# Patient Record
Sex: MALE | Race: WHITE | Employment: OTHER | URBAN - METROPOLITAN AREA
[De-identification: names, ages, dates, MRNs, and addresses within clinical notes are randomized per-mention and may not be internally consistent; named-entity substitution may affect disease eponyms.]

---

## 2018-10-15 DIAGNOSIS — I35.0 NODULAR CALCIFIC AORTIC VALVE STENOSIS: Primary | ICD-10-CM

## 2018-10-15 DIAGNOSIS — N18.9 CHRONIC KIDNEY DISEASE, UNSPECIFIED CKD STAGE: ICD-10-CM

## 2018-11-05 ENCOUNTER — OFFICE VISIT (OUTPATIENT)
Dept: CARDIOLOGY | Facility: CLINIC | Age: 83
End: 2018-11-05
Payer: MEDICARE

## 2018-11-05 ENCOUNTER — HOSPITAL ENCOUNTER (OUTPATIENT)
Dept: CARDIOLOGY | Facility: CLINIC | Age: 83
Discharge: HOME | End: 2018-11-05
Attending: INTERNAL MEDICINE
Payer: MEDICARE

## 2018-11-05 VITALS
WEIGHT: 214.5 LBS | HEART RATE: 69 BPM | BODY MASS INDEX: 30.03 KG/M2 | HEIGHT: 71 IN | DIASTOLIC BLOOD PRESSURE: 79 MMHG | SYSTOLIC BLOOD PRESSURE: 187 MMHG | OXYGEN SATURATION: 99 %

## 2018-11-05 VITALS — DIASTOLIC BLOOD PRESSURE: 68 MMHG | HEART RATE: 62 BPM | SYSTOLIC BLOOD PRESSURE: 160 MMHG

## 2018-11-05 DIAGNOSIS — I65.03 STENOSIS OF BOTH VERTEBRAL ARTERIES: ICD-10-CM

## 2018-11-05 DIAGNOSIS — K27.4 GASTROINTESTINAL HEMORRHAGE ASSOCIATED WITH PEPTIC ULCER: ICD-10-CM

## 2018-11-05 DIAGNOSIS — I48.0 PAROXYSMAL ATRIAL FIBRILLATION: ICD-10-CM

## 2018-11-05 DIAGNOSIS — I35.0 NODULAR CALCIFIC AORTIC VALVE STENOSIS: Primary | ICD-10-CM

## 2018-11-05 DIAGNOSIS — Z95.0 CARDIAC PACEMAKER IN SITU: ICD-10-CM

## 2018-11-05 DIAGNOSIS — I10 ESSENTIAL HYPERTENSION: ICD-10-CM

## 2018-11-05 DIAGNOSIS — Z86.73 HISTORY OF TRANSIENT ISCHEMIC ATTACK (TIA): ICD-10-CM

## 2018-11-05 DIAGNOSIS — I35.0 NONRHEUMATIC AORTIC VALVE STENOSIS: ICD-10-CM

## 2018-11-05 DIAGNOSIS — I35.0 NODULAR CALCIFIC AORTIC VALVE STENOSIS: ICD-10-CM

## 2018-11-05 PROBLEM — I65.09 VERTEBRAL ARTERY STENOSIS: Status: ACTIVE | Noted: 2018-11-05

## 2018-11-05 LAB
ASCENDING AORTA: 3.5 CM
AV MEAN GRADIENT: 25 MMHG
AV PEAK GRADIENT: 44 MMHG
AV VALVE AREA: 1.21 CM2
CV ECHO LV RWT: 0.3 CM
DOP CALC AO PEAK VEL: 3.3 M/S
DOP CALC AO VTI: 86 CM
DOP CALC LVOT AREA: 3.87 CM2
DOP CALC LVOT DIAMETER: 2.22 CM
DOP CALC LVOT STROKE VOLUME: 104.46 CM3
DOP CALCLVOT PEAK VEL VTI: 27 CM
E WAVE DECELERATION TIME: 241.58 MSEC
E/A RATIO: 0.92
E/E' RATIO: 12.86
ECHO LV POSTERIOR WALL: 0.72 CM (ref 0.6–1.1)
FRACTIONAL SHORTENING: 27 % (ref 28–44)
INTERVENTRICULAR SEPTUM: 0.89 CM (ref 0.6–1.1)
IVRT: 0.09 MSEC
LA MAJOR: 5.25 CM
LA MINOR: 5.26 CM
LA WIDTH: 3.65 CM
LEFT ATRIUM SIZE: 4.13 CM
LEFT ATRIUM VOLUME: 67.33 CM3
LEFT INTERNAL DIMENSION IN SYSTOLE: 3.53 CM (ref 2.1–4)
LEFT VENTRICLE DIASTOLIC VOLUME: 109.25 ML
LEFT VENTRICLE SYSTOLIC VOLUME: 51.85 ML
LEFT VENTRICULAR INTERNAL DIMENSION IN DIASTOLE: 4.83 CM (ref 3.5–6)
LEFT VENTRICULAR MASS: 129.07 G
LV LATERAL E/E' RATIO: 11.25
LV SEPTAL E/E' RATIO: 15
MV PEAK A VEL: 0.98 M/S
MV PEAK E VEL: 0.9 M/S
MV STENOSIS PRESSURE HALF TIME: 70.06 MS
MV VALVE AREA P 1/2 METHOD: 3.14 CM2
PISA TR MAX VEL: 2.62 M/S
PULM VEIN S/D RATIO: 0.97
PV PEAK D VEL: 0.59 M/S
PV PEAK S VEL: 0.57 M/S
RA MAJOR: 4.38 CM
RA PRESSURE: 3 MMHG
RA WIDTH: 2.9 CM
RETIRED EF AND QEF - SEE NOTES: 52.54 %
RIGHT VENTRICULAR END-DIASTOLIC DIMENSION: 3.28 CM
SINUS: 3.71 CM
STJ: 3.05 CM
TDI LATERAL: 0.08
TDI SEPTAL: 0.06
TDI: 0.07
TR MAX PG: 27.46 MMHG
TRICUSPID ANNULAR PLANE SYSTOLIC EXCURSION: 2.14 CM
TV REST PULMONARY ARTERY PRESSURE: 30.46 MMHG

## 2018-11-05 PROCEDURE — 99999 PR PBB SHADOW E&M-EST. PATIENT-LVL IV: CPT | Mod: PBBFAC,,,

## 2018-11-05 PROCEDURE — 99204 OFFICE O/P NEW MOD 45 MIN: CPT | Mod: S$PBB,,, | Performed by: INTERNAL MEDICINE

## 2018-11-05 PROCEDURE — 93306 TTE W/DOPPLER COMPLETE: CPT | Mod: PBBFAC | Performed by: INTERNAL MEDICINE

## 2018-11-05 PROCEDURE — 99205 OFFICE O/P NEW HI 60 MIN: CPT | Mod: S$PBB,,, | Performed by: THORACIC SURGERY (CARDIOTHORACIC VASCULAR SURGERY)

## 2018-11-05 PROCEDURE — 99214 OFFICE O/P EST MOD 30 MIN: CPT | Mod: PBBFAC,25

## 2018-11-05 RX ORDER — AMLODIPINE BESYLATE 2.5 MG/1
2.5 TABLET ORAL DAILY
COMMUNITY

## 2018-11-05 RX ORDER — NITROGLYCERIN 0.4 MG/1
0.4 TABLET SUBLINGUAL EVERY 5 MIN PRN
COMMUNITY

## 2018-11-05 RX ORDER — CLOPIDOGREL BISULFATE 75 MG/1
75 TABLET ORAL DAILY
COMMUNITY

## 2018-11-05 RX ORDER — UBIDECARENONE 75 MG
500 CAPSULE ORAL DAILY
COMMUNITY

## 2018-11-05 RX ORDER — PANTOPRAZOLE SODIUM 40 MG/1
40 TABLET, DELAYED RELEASE ORAL 2 TIMES DAILY
COMMUNITY

## 2018-11-05 RX ORDER — METOPROLOL SUCCINATE 50 MG/1
50 TABLET, EXTENDED RELEASE ORAL DAILY
COMMUNITY

## 2018-11-05 RX ORDER — DOCUSATE SODIUM 100 MG/1
100 CAPSULE, LIQUID FILLED ORAL 2 TIMES DAILY
COMMUNITY

## 2018-11-05 RX ORDER — LOSARTAN POTASSIUM 100 MG/1
100 TABLET ORAL DAILY
COMMUNITY

## 2018-11-05 RX ORDER — ROSUVASTATIN CALCIUM 5 MG/1
5 TABLET, COATED ORAL DAILY
COMMUNITY

## 2018-11-05 RX ORDER — FUROSEMIDE 20 MG/1
20 TABLET ORAL 2 TIMES DAILY
COMMUNITY

## 2018-11-05 RX ORDER — FERROUS SULFATE 324(65)MG
325 TABLET, DELAYED RELEASE (ENTERIC COATED) ORAL 3 TIMES DAILY
COMMUNITY

## 2018-11-05 NOTE — ASSESSMENT & PLAN NOTE
Moderate.     He has undergone the following TAVR work-up:  · Echo (11/5/18): JOSE R is 1.21 cm2; peak velocity is 3.3 m/s; mean gradient is 25 mmHg, EF= 60%  · Needs coronary angiogram  · Frailty: 0/4  · Prelim STS= 3.9%  · Rhythm issues: PPM in situ  · Needs TAVR CTA  · He is moderate risk per Dr. De La Cruz due to age and comorbidities.   · Comorbidities: s/p bilat Vert stents, hx of TIA, PAF s/p DCCV, Hx of GIB and PUD, HTN

## 2018-11-05 NOTE — PROGRESS NOTES
Cardiothoracic Surgery  Transcatheter Aortic Valve Replacement Evaluation      SUBJECTIVE:     History of Present Illness:  Patient is an 84 year old male with pAF, PPM, prior TIA, vertebral artery stenosis s/p b/l stents, prior GIB, HTN, who comes to valve clinic today with moderate aortic stenosis.  He states that he has dyspnea on exertion after walking a few blocks.  He is currently being followed every 6 months with echos for his valve disease.  He comes here today for a second opinion about management of his moderate aortic stenosis.             Past Medical History:   Diagnosis Date    Hyperlipidemia     Hypertension      No past surgical history on file.  No family history on file.  Social History     Tobacco Use    Smoking status: Former Smoker     Last attempt to quit: 1968     Years since quittin.0    Smokeless tobacco: Never Used   Substance Use Topics    Alcohol use: No     Frequency: Never    Drug use: Not on file      Review of patient's allergies indicates:  No Known Allergies  Current medications reviewed    Review of Systems:  Constitutional: Negative for fever, chills, distress  Skin: no acute disorders.  Negative for rash, itching  HEENT: unremarkable.  Negative for sore throat, congestion  Cardiovascular: Negative for chest pain, orthopnea, lower extremity edema.    Respiratory: Negative for cough.  Positive for shortness of breath  GI:  unremarkable.  Negative for abdominal pain, vomiting  :  Negative for hematuria, flank pain  Musculoskeletal: unremarkable.  Negative for falls, myalgias  Neuro:  Negative for dizziness, LOC  Psych:  Negative for substance abuse, memory loss      OBJECTIVE:     Vital Signs (Most Recent)     Vital signs reviewed    Physical Exam:  General Appearance: no acute distress.  Normal for age  Skin: no acute lesions  HEENT: no masses/hematoma, Jugular veins: not distended  Resp:  excursion/effort normal; clear to auscultation  CV:  Rate:   regular  Rhythm: regular  Murmur:  systolic ejection murmur at right upper sternal border  GI: Bowel sounds: present; abdo soft, nondistended, nontender, no masses palpable  Extrem: Edema: minimal  Pulses: normal  Neuro: Alert and oriented; no focal deficit  Psych: no acute delirium noted  : voiding well  MSK: no acute findings, ranges of motion unchanged    He has undergone the following TAVR work-up:  ? Echo (11/5/18): JOSE R is 1.21 cm2; peak velocity is 3.3 m/s; mean gradient is 25 mmHg, EF= 60%  ? Needs coronary angiogram  ? Frailty: 0/4  ? Prelim STS= 3.9%  ? Rhythm issues: PPM in situ  ? Needs TAVR CTA  ? Comorbidities: s/p bilat Vert stents, hx of TIA, PAF s/p DCCV, Hx of GIB and PUD, HTN      I have personally reviewed the imaging, electrocardiogram, and pertinent lab findings    ASSESSMENT/PLAN:       84 y.o. male with multiple comorbidities including pAF, PPM, prior TIA, vertebral artery stenosis s/p b/l stents, prior GIB, HTN, and frailty presented with moderate aortic stenosis.     Currently, the current management of serial TTE's is appropriate.  If his valvular disease progresses to severe, he should see us again.  He is deemed intermediate risk for surgical aortic valve replacement.      I spent >70 minutes reviewing, examining and evaluating this patient, including reviewing relevant diagnostic studies, and 50% of which were spent on patient counseling including the pt's condition, diagnosis, prognosis, expected recovery after SAVR vs TAVR, follow-up plan and next steps.

## 2018-11-05 NOTE — PROGRESS NOTES
"Subjective:    Patient ID:  Travon Lynn is a 84 y.o. male who presents for evaluation of Aortic Stenosis    Referring: Dr. Galaviz    HPI  Mr. Lynn is a delightful gentleman who has followed with Dr. Galaviz in [place] for more than 15 years. He has a PMH significant for vertebral artery stenosis s/p bilateral stents, hx of TIA (2010), paroxysmal a-fib s/p DCCV (2011), hx of GIB and PUD (2011), moderate AS, PPM in situ, and HTN. He has noted worsening PRAJAPATI over the past 6-8 months. He has known about his AS since 2013, but it was not severe, so he was followed every 6 months with an echo. He recently had a SHIREEN (Dr. Charles) and was told his AS was "almost severe". He and his wife were concerned, and expressed this to their neurologist, Dr. Rivas Awad, who suggested they seek a second opinion in [place] with Dr. Calabrese. He presents today for further evaluation.     He has undergone the following TAVR work-up:  · Echo (11/5/18): JOSE R is 1.21 cm2; peak velocity is 3.3 m/s; mean gradient is 25 mmHg, EF= 60%  · Needs coronary angiogram  · Frailty: 0/4  · Prelim STS= 3.9%  · Rhythm issues: PPM in situ  · Needs TAVR CTA  · He is moderate risk per Dr. De La Cruz due to age and comorbidities.   · Comorbidities: s/p bilat Vert stents, hx of TIA, PAF s/p DCCV, Hx of GIB and PUD, HTN    Review of Systems   Constitution: Negative for chills, diaphoresis, fever, weakness, weight gain and weight loss.   HENT: Negative for sore throat.    Eyes: Negative for blurred vision, vision loss in left eye, vision loss in right eye and visual disturbance.   Cardiovascular: Positive for dyspnea on exertion. Negative for chest pain, claudication, leg swelling, near-syncope, orthopnea, palpitations, paroxysmal nocturnal dyspnea and syncope.   Respiratory: Negative for cough, hemoptysis, shortness of breath, sputum production and wheezing.    Endocrine: Negative for cold intolerance and heat intolerance.   Hematologic/Lymphatic: " "Negative for adenopathy. Does not bruise/bleed easily.   Skin: Negative for rash.   Musculoskeletal: Negative for falls, muscle weakness and myalgias.   Gastrointestinal: Negative for abdominal pain, change in bowel habit, constipation, diarrhea, melena and nausea.   Genitourinary: Negative for bladder incontinence.   Neurological: Negative for dizziness, focal weakness, headaches, light-headedness and numbness.   Psychiatric/Behavioral: Negative for altered mental status.         Vitals:    11/05/18 1348 11/05/18 1351   BP: (!) 199/86 (!) 187/79   BP Location: Left arm Right arm   Patient Position: Sitting Sitting   BP Method: Large (Automatic) Large (Automatic)   Pulse: 76 69   SpO2: 99%    Weight: 97.3 kg (214 lb 8.1 oz)    Height: 5' 10.5" (1.791 m)    Body mass index is 30.34 kg/m².    Objective:    Physical Exam   Constitutional: He is oriented to person, place, and time. He appears well-developed and well-nourished.   HENT:   Head: Normocephalic and atraumatic.   Eyes: EOM are normal. Pupils are equal, round, and reactive to light.   Neck: Neck supple. No JVD present. No tracheal deviation present. No thyromegaly present.   Cardiovascular: Normal rate, regular rhythm, S1 normal, S2 normal, intact distal pulses and normal pulses. PMI is not displaced. Exam reveals no gallop and no friction rub.   No murmur heard.  Pulmonary/Chest: Effort normal and breath sounds normal. No respiratory distress. He has no wheezes. He has no rales. He exhibits no tenderness.   Abdominal: Soft. Bowel sounds are normal. He exhibits no distension and no mass. There is no tenderness.   Musculoskeletal: Normal range of motion. He exhibits no edema or tenderness.   Neurological: He is alert and oriented to person, place, and time.   Skin: Skin is warm and dry. No rash noted.   Psychiatric: He has a normal mood and affect. His behavior is normal.         Assessment:         Nonrheumatic aortic valve stenosis  Moderate.     He has " undergone the following TAVR work-up:  · Echo (11/5/18): JOSE R is 1.21 cm2; peak velocity is 3.3 m/s; mean gradient is 25 mmHg, EF= 60%  · Needs coronary angiogram  · Frailty: 0/4  · Prelim STS= 3.9%  · Rhythm issues: PPM in situ  · Needs TAVR CTA  · He is moderate risk per Dr. De La Cruz due to age and comorbidities.   · Comorbidities: s/p bilat Vert stents, hx of TIA, PAF s/p DCCV, Hx of GIB and PUD, HTN    Vertebral artery stenosis  S/p bilateral stents.   On ASA, Plavix, and statin.     History of transient ischemic attack (TIA)  2011.   Follows with Neurology, Dr. Awad.     Paroxysmal atrial fibrillation  S/p DCCV in 2011.   No further episodes since that time per patient's report.   On Multaq.   Not on AC.       Gastrointestinal hemorrhage associated with peptic ulcer  2011.  S/p transfusion.   H/H stable.   On iron supplementation.     Cardiac pacemaker in situ  Recent battery change.  Follows with Dr. Galaviz.     Essential hypertension  Uncontrolled.   Will defer further management to Dr. Galaviz.     Plan:       Patient does not have severe AS. Symptoms of PRAJAPATI could be secondary to CAD vs. Diastolic dysfunction. Recommend coronary angiogram -- patient wishes to have this done in [place]. If unremarkable, would treat for diastolic dysfunction with aggressive BP control (ARB), diuretic, and salt/fluid restrictions.   If/when the patient develops severe AS, we would be happy to see him for TAVR.         Staff:  I have personally taken the history and examined this patient and agree with the fellow's note as stated above and amended it accordingly :-) His AS is not severe enough to treat yet, but I recommend he have a coronary angiogram and possible PCI because of his symptom-limiting PRAJAPATI.  [facility]

## 2018-11-05 NOTE — ASSESSMENT & PLAN NOTE
S/p DCCV in 2011.   No further episodes since that time per patient's report.   On Multaq.   Not on AC.

## 2024-06-12 ENCOUNTER — APPOINTMENT (RX ONLY)
Dept: URBAN - NONMETROPOLITAN AREA CLINIC 1 | Facility: CLINIC | Age: 89
Setting detail: DERMATOLOGY
End: 2024-06-12

## 2024-06-12 DIAGNOSIS — L57.8 OTHER SKIN CHANGES DUE TO CHRONIC EXPOSURE TO NONIONIZING RADIATION: ICD-10-CM

## 2024-06-12 DIAGNOSIS — L82.0 INFLAMED SEBORRHEIC KERATOSIS: ICD-10-CM

## 2024-06-12 DIAGNOSIS — L81.4 OTHER MELANIN HYPERPIGMENTATION: ICD-10-CM

## 2024-06-12 DIAGNOSIS — L57.0 ACTINIC KERATOSIS: ICD-10-CM

## 2024-06-12 PROCEDURE — 17110 DESTRUCTION B9 LES UP TO 14: CPT

## 2024-06-12 PROCEDURE — 99203 OFFICE O/P NEW LOW 30 MIN: CPT | Mod: 25

## 2024-06-12 PROCEDURE — ? SUNSCREEN RECOMMENDATIONS

## 2024-06-12 PROCEDURE — ? LIQUID NITROGEN

## 2024-06-12 PROCEDURE — ? PHOTO-DOCUMENTATION

## 2024-06-12 PROCEDURE — ? COUNSELING

## 2024-06-12 PROCEDURE — ? OBSERVATION AND MEASURE

## 2024-06-12 PROCEDURE — ? MDM - TREATMENT GOALS

## 2024-06-12 PROCEDURE — 17000 DESTRUCT PREMALG LESION: CPT | Mod: 59

## 2024-06-12 ASSESSMENT — LOCATION SIMPLE DESCRIPTION DERM
LOCATION SIMPLE: RIGHT EAR
LOCATION SIMPLE: RIGHT HAND
LOCATION SIMPLE: LEFT FOREARM
LOCATION SIMPLE: FRONTAL SCALP
LOCATION SIMPLE: LEFT CHEEK

## 2024-06-12 ASSESSMENT — LOCATION DETAILED DESCRIPTION DERM
LOCATION DETAILED: RIGHT RADIAL DORSAL HAND
LOCATION DETAILED: LEFT VENTRAL PROXIMAL FOREARM
LOCATION DETAILED: LEFT CENTRAL MALAR CHEEK
LOCATION DETAILED: RIGHT INFERIOR POSTERIOR HELIX
LOCATION DETAILED: MEDIAL FRONTAL SCALP

## 2024-06-12 ASSESSMENT — LOCATION ZONE DERM
LOCATION ZONE: ARM
LOCATION ZONE: EAR
LOCATION ZONE: SCALP
LOCATION ZONE: FACE
LOCATION ZONE: HAND

## 2024-06-12 NOTE — PROCEDURE: LIQUID NITROGEN
Consent: The patient's consent was obtained including but not limited to risks of crusting, scabbing, blistering, scarring, darker or lighter pigmentary change, recurrence, incomplete removal and infection.
Show Aperture Variable?: Yes
Render Note In Bullet Format When Appropriate: No
Duration Of Freeze Thaw-Cycle (Seconds): 5
Post-Care Instructions: I reviewed with the patient in detail post-care instructions. Patient is to wear sunprotection, and avoid picking at any of the treated lesions. Pt may apply Vaseline to crusted or scabbing areas.
Detail Level: Detailed
Medical Necessity Clause: This procedure was medically necessary because the lesions that were treated were:
Medical Necessity Information: It is in your best interest to select a reason for this procedure from the list below. All of these items fulfill various CMS LCD requirements except the new and changing color options.
Spray Paint Text: The liquid nitrogen was applied to the skin utilizing a spray paint frosting technique.